# Patient Record
Sex: MALE | Race: WHITE | NOT HISPANIC OR LATINO | URBAN - METROPOLITAN AREA
[De-identification: names, ages, dates, MRNs, and addresses within clinical notes are randomized per-mention and may not be internally consistent; named-entity substitution may affect disease eponyms.]

---

## 2017-01-03 ENCOUNTER — APPOINTMENT (OUTPATIENT)
Dept: URBAN - METROPOLITAN AREA CLINIC 217 | Age: 57
Setting detail: DERMATOLOGY
End: 2017-01-03

## 2017-01-03 DIAGNOSIS — L30.9 DERMATITIS, UNSPECIFIED: ICD-10-CM

## 2017-01-03 PROCEDURE — 99213 OFFICE O/P EST LOW 20 MIN: CPT

## 2017-01-03 PROCEDURE — OTHER DIAGNOSIS COMMENT: OTHER

## 2017-01-03 PROCEDURE — OTHER COUNSELING: OTHER

## 2017-01-03 PROCEDURE — OTHER TREATMENT REGIMEN: OTHER

## 2017-01-03 NOTE — PROCEDURE: DIAGNOSIS COMMENT
Comment: Possibly due to chemicals in new covering on recliner.  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
Detail Level: Simple